# Patient Record
Sex: FEMALE | Race: WHITE
[De-identification: names, ages, dates, MRNs, and addresses within clinical notes are randomized per-mention and may not be internally consistent; named-entity substitution may affect disease eponyms.]

---

## 2017-01-07 ENCOUNTER — HOSPITAL ENCOUNTER (EMERGENCY)
Dept: HOSPITAL 17 - PHED | Age: 72
Discharge: HOME | End: 2017-01-07
Payer: MEDICARE

## 2017-01-07 VITALS
RESPIRATION RATE: 16 BRPM | HEART RATE: 82 BPM | DIASTOLIC BLOOD PRESSURE: 69 MMHG | OXYGEN SATURATION: 98 % | SYSTOLIC BLOOD PRESSURE: 124 MMHG

## 2017-01-07 VITALS — HEIGHT: 66 IN | WEIGHT: 169.76 LBS | BODY MASS INDEX: 27.28 KG/M2

## 2017-01-07 VITALS
DIASTOLIC BLOOD PRESSURE: 82 MMHG | HEART RATE: 106 BPM | SYSTOLIC BLOOD PRESSURE: 117 MMHG | OXYGEN SATURATION: 99 % | TEMPERATURE: 98.4 F | RESPIRATION RATE: 16 BRPM

## 2017-01-07 VITALS — OXYGEN SATURATION: 99 %

## 2017-01-07 DIAGNOSIS — K57.90: ICD-10-CM

## 2017-01-07 DIAGNOSIS — Z85.3: ICD-10-CM

## 2017-01-07 DIAGNOSIS — R53.83: ICD-10-CM

## 2017-01-07 DIAGNOSIS — E86.0: ICD-10-CM

## 2017-01-07 DIAGNOSIS — R31.29: ICD-10-CM

## 2017-01-07 DIAGNOSIS — R11.0: ICD-10-CM

## 2017-01-07 DIAGNOSIS — R10.9: Primary | ICD-10-CM

## 2017-01-07 LAB
ALP SERPL-CCNC: 54 U/L (ref 45–117)
ALT SERPL-CCNC: 36 U/L (ref 10–53)
ANION GAP SERPL CALC-SCNC: 9 MEQ/L (ref 5–15)
AST SERPL-CCNC: 16 U/L (ref 15–37)
BACTERIA #/AREA URNS HPF: (no result) /HPF
BASOPHILS # BLD AUTO: 0 TH/MM3 (ref 0–0.2)
BASOPHILS NFR BLD: 0.4 % (ref 0–2)
BILIRUB SERPL-MCNC: 0.3 MG/DL (ref 0.2–1)
BUN SERPL-MCNC: 16 MG/DL (ref 7–18)
CHLORIDE SERPL-SCNC: 103 MEQ/L (ref 98–107)
COLOR UR: YELLOW
COMMENT (UR): (no result)
CULTURE IF INDICATED: (no result)
EOSINOPHIL # BLD: 0.1 TH/MM3 (ref 0–0.4)
EOSINOPHIL NFR BLD: 1.6 % (ref 0–4)
ERYTHROCYTE [DISTWIDTH] IN BLOOD BY AUTOMATED COUNT: 12.5 % (ref 11.6–17.2)
GFR SERPLBLD BASED ON 1.73 SQ M-ARVRAT: 57 ML/MIN (ref 89–?)
GLUCOSE UR STRIP-MCNC: (no result) MG/DL
HCO3 BLD-SCNC: 29.8 MEQ/L (ref 21–32)
HCT VFR BLD CALC: 42 % (ref 35–46)
HEMO FLAGS: (no result)
HGB UR QL STRIP: (no result)
KETONES UR STRIP-MCNC: (no result) MG/DL
LEUKOCYTE ESTERASE UR QL STRIP: (no result) /HPF (ref 0–5)
LYMPHOCYTES # BLD AUTO: 3.1 TH/MM3 (ref 1–4.8)
LYMPHOCYTES NFR BLD AUTO: 41 % (ref 9–44)
MCH RBC QN AUTO: 30.7 PG (ref 27–34)
MCHC RBC AUTO-ENTMCNC: 33.4 % (ref 32–36)
MCV RBC AUTO: 91.9 FL (ref 80–100)
METHOD OF COLLECTION: (no result)
MONOCYTES NFR BLD: 8.7 % (ref 0–8)
NEUTROPHILS # BLD AUTO: 3.7 TH/MM3 (ref 1.8–7.7)
NEUTROPHILS NFR BLD AUTO: 48.3 % (ref 16–70)
NITRITE UR QL STRIP: (no result)
PLATELET # BLD: 344 TH/MM3 (ref 150–450)
POTASSIUM SERPL-SCNC: 4 MEQ/L (ref 3.5–5.1)
RBC # BLD AUTO: 4.57 MIL/MM3 (ref 4–5.3)
RBC #/AREA URNS HPF: (no result) /HPF (ref 0–3)
SODIUM SERPL-SCNC: 142 MEQ/L (ref 136–145)
SP GR UR STRIP: 1.02 (ref 1–1.03)
SQUAMOUS #/AREA URNS HPF: > 8 /HPF (ref 0–5)
WBC # BLD AUTO: 7.6 TH/MM3 (ref 4–11)

## 2017-01-07 PROCEDURE — 81001 URINALYSIS AUTO W/SCOPE: CPT

## 2017-01-07 PROCEDURE — 96360 HYDRATION IV INFUSION INIT: CPT

## 2017-01-07 PROCEDURE — 93005 ELECTROCARDIOGRAM TRACING: CPT

## 2017-01-07 PROCEDURE — 80053 COMPREHEN METABOLIC PANEL: CPT

## 2017-01-07 PROCEDURE — 74177 CT ABD & PELVIS W/CONTRAST: CPT

## 2017-01-07 PROCEDURE — 84484 ASSAY OF TROPONIN QUANT: CPT

## 2017-01-07 PROCEDURE — 85025 COMPLETE CBC W/AUTO DIFF WBC: CPT

## 2017-01-07 PROCEDURE — 99284 EMERGENCY DEPT VISIT MOD MDM: CPT

## 2017-01-07 PROCEDURE — 83690 ASSAY OF LIPASE: CPT

## 2017-01-07 NOTE — RADHPO
EXAM DATE/TIME:  2017 18:05 

 

[facility] COMPARISON:     

No previous studies available for comparison.

 

 

INDICATIONS :     

Left lower abdomen pain for two days.

                      

 

IV CONTRAST:     

85 cc Omnipaque 300 (iohexol) IV 

 

 

ORAL CONTRAST:      

No oral contrast ingested.

                      

 

RADIATION DOSE:     

11.18 CTDIvol (mGy) 

 

 

MEDICAL HISTORY :     

Carcinoma, breast.  

 

SURGICAL HISTORY :      

Mastectomy, right.  section.

 

ENCOUNTER:      

Initial

 

ACUITY:      

1 day

 

PAIN SCALE:      

0/10

 

LOCATION:       

Left lower quadrant 

 

TECHNIQUE:     

Volumetric scanning of the abdomen and pelvis was performed.  Using automated exposure control and ad
justment of the mA and/or kV according to patient size, radiation dose was kept as low as reasonably 
achievable to obtain optimal diagnostic quality images. 

 

FINDINGS:     

 

LOWER LUNGS:     

The visualized lower lungs are clear.

 

LIVER:     

Mild diffuse increase in hepatic density, likely steatosis with some focal sparing in the left lobe. 
No evidence of mass or biliary ductal dilatation. Tiny calcified gallstones.

 

SPLEEN:     

Normal size without lesion.

 

PANCREAS:     

Within normal limits.

 

KIDNEYS:     

Normal in size and shape.  There is no mass, stone or hydronephrosis.

 

ADRENAL GLANDS:     

Within normal limits.

 

VASCULAR:     

There is no aortic aneurysm.

 

BOWEL/MESENTERY:     

Prominent distal colonic diverticulosis. No evidence of abnormal wall thickening, dilatation or focal
 inflammatory change. No extraluminal gas or fluid.

 

ABDOMINAL WALL:     

Within normal limits.

 

RETROPERITONEUM:     

There is no lymphadenopathy. 

 

BLADDER:     

No wall thickening or mass. 

 

REPRODUCTIVE:     

Within normal limits.

 

INGUINAL:     

There is no lymphadenopathy or hernia. 

 

MUSCULOSKELETAL:     

Within normal limits for patient age. 

 

CONCLUSION:     

Hepatic steatosis.

Gallstones.

Colonic diverticulosis without definite CT findings of diverticulitis.

 

 

 

 Mika Patel MD on 2017 at 18:23           

Board Certified Radiologist.

 This report was verified electronically.

## 2017-01-07 NOTE — PD
HPI


Chief Complaint:  General Weakness


Time Seen by Provider:  16:26


Travel History


International Travel<30 days:  No


Contact w/Intl Traveler<30days:  No


Traveled to known affect area:  No





History of Present Illness


HPI


Patient is 71-year-old female presents today with fatigue for the past day.  

Patient states that 3 days ago she had abdominal cramping in the left lower 

quadrant thought she might be having a bout of diverticulitis.  She states her 

abdominal pain has been resolved by by the time she arrived in the emergency 

department.  She states she just feels rundown and fatigued and wanted to have 

her blood counts checked to make sure she wasn't anemic.  Patient states she's 

had some nausea without vomiting no diarrhea no constipation.  No fevers no 

blood in the stool.  No dysuria.





PFSH


Past Medical History


Cancer:  Yes (BREAST)


Diabetes:  No


Diminished Hearing:  No


Tetanus Vaccination:  > 5 Years


Influenza Vaccination:  Yes


Pregnant?:  Not Pregnant


Menopausal:  Yes





Past Surgical History


 Section:  Yes


Gynecologic Surgery:  Yes (RT BREAST MASTECTOMY)


Other Surgery:  Yes (MOD RIGHT MASTECTOMY. )





Social History


Alcohol Use:  No


Tobacco Use:  No


Substance Use:  No





Allergies-Medications


(Allergen,Severity, Reaction):  


Coded Allergies:  


     Avelox (Verified  Allergy, Severe, Anaphylaxis, 17)


     Levaquin (Verified  Allergy, Severe, Anaphylaxis, 17)


Reported Meds & Prescriptions





Reported Meds & Active Scripts


Active


Flagyl (Metronidazole) 500 Mg Tab 500 Mg PO BID 7 Days


Keflex (Cephalexin) 500 Mg Cap 500 Mg PO Q6H 7 Days


Zofran Odt (Ondansetron Odt) 4 Mg Tab 4 Mg SL Q6HR PRN








Review of Systems


Except as stated in HPI:  all other systems reviewed are Neg





Physical Exam


Narrative


GENERAL: [Well-developed well-nourished in no apparent distress


SKIN: Warm and dry.


HEAD: Atraumatic. Normocephalic. 


EYES: Pupils equal and round. No scleral icterus. No injection or drainage. 


ENT: No nasal bleeding or discharge.  Mucous membranes pink and moist.


NECK: Trachea midline. No JVD. 


CARDIOVASCULAR: Regular rate and rhythm.  No murmur appreciated.


RESPIRATORY: No accessory muscle use. Clear to auscultation. Breath sounds 

equal bilaterally. 


GASTROINTESTINAL: Abdomen soft, non-tender, nondistended. Hepatic and splenic 

margins not palpable. 


MUSCULOSKELETAL: No obvious deformities. No clubbing.  No cyanosis.  No edema. 


NEUROLOGICAL: Awake and alert. No obvious cranial nerve deficits.  Motor 

grossly within normal limits. Normal speech.


PSYCHIATRIC: Appropriate mood and affect; insight and judgment normal.





Data


Data


Last Documented VS





Vital Signs








  Date Time  Temp Pulse Resp B/P Pulse Ox O2 Delivery O2 Flow Rate FiO2


 


17 19:36     98   


 


17 19:05  82 16 124/69  Room Air  


 


17 16:00 98.4       








Orders





 Complete Blood Count With Diff (17 16:27)


Comprehensive Metabolic Panel (17 16:27)


Lipase (17 16:27)


Urinalysis - C+S If Indicated (17 16:27)


Iv Access Insert/Monitor (17 16:27)


Ecg Monitoring (17 16:27)


Oximetry (17 16:27)


Sodium Chloride 0.9% Flush (Ns Flush) (17 16:30)


Electrocardiogram (17 16:27)


Troponin I (17 16:27)


Sodium Chlor 0.9% 1000 Ml Inj (Ns 1000 M (17 16:45)


Ct Abd/Pel W Iv Contrast(Rout) (17 )


Iohexol 300 Inj (Omnipaque 300 Inj) (17 18:11)


Cephalexin (Keflex) (17 19:15)


Metronidazole (Flagyl) (17 19:15)





Labs





 Laboratory Tests








Test 17





 16:45 16:50


 


Urine Collection Type CLEAN CATCH  


 


Urine Color YELLOW  


 


Urine Turbidity CLEAR  


 


Urine pH 6.0  


 


Urine Specific Gravity 1.022  


 


Urine Protein NEG mg/dL 


 


Urine Glucose (UA) NEG mg/dL 


 


Urine Ketones NEG mg/dL 


 


Urine Occult Blood TRACE  


 


Urine Nitrite NEG  


 


Urine Bilirubin NEG  


 


Urine Leukocyte Esterase TRACE  


 


Urine RBC 4-9 /hpf 


 


Urine WBC 6-8 /hpf 


 


Urine Squamous Epithelial > 8 /hpf 





Cells  


 


Urine Bacteria RARE /hpf 


 


Microscopic Urinalysis Comment CULT NOT 





 INDICATED 


 


Urine Collection Time 16:45  


 


White Blood Count  7.6 TH/MM3


 


Red Blood Count  4.57 MIL/MM3


 


Hemoglobin  14.1 GM/DL


 


Hematocrit  42.0 %


 


Mean Corpuscular Volume  91.9 FL


 


Mean Corpuscular Hemoglobin  30.7 PG


 


Mean Corpuscular Hemoglobin  33.4 %





Concent  


 


Red Cell Distribution Width  12.5 %


 


Platelet Count  344 TH/MM3


 


Mean Platelet Volume  7.1 FL


 


Neutrophils (%) (Auto)  48.3 %


 


Lymphocytes (%) (Auto)  41.0 %


 


Monocytes (%) (Auto)  8.7 %


 


Eosinophils (%) (Auto)  1.6 %


 


Basophils (%) (Auto)  0.4 %


 


Neutrophils # (Auto)  3.7 TH/MM3


 


Lymphocytes # (Auto)  3.1 TH/MM3


 


Monocytes # (Auto)  0.7 TH/MM3


 


Eosinophils # (Auto)  0.1 TH/MM3


 


Basophils # (Auto)  0.0 TH/MM3


 


CBC Comment  DIFF FINAL 


 


Differential Comment   


 


Sodium Level  142 MEQ/L


 


Potassium Level  4.0 MEQ/L


 


Chloride Level  103 MEQ/L


 


Carbon Dioxide Level  29.8 MEQ/L


 


Anion Gap  9 MEQ/L


 


Blood Urea Nitrogen  16 MG/DL


 


Creatinine  0.96 MG/DL


 


Estimat Glomerular Filtration  57 ML/MIN





Rate  


 


Random Glucose  106 MG/DL


 


Calcium Level  9.4 MG/DL


 


Total Bilirubin  0.3 MG/DL


 


Aspartate Amino Transf  16 U/L





(AST/SGOT)  


 


Alanine Aminotransferase  36 U/L





(ALT/SGPT)  


 


Alkaline Phosphatase  54 U/L


 


Troponin I  LESS THAN 0.02





  NG/ML


 


Total Protein  8.1 GM/DL


 


Albumin  3.8 GM/DL


 


Lipase  169 U/L











MDM


Medical Decision Making


Medical Screen Exam Complete:  Yes


Emergency Medical Condition:  Yes


Interpretation(s)


EKG shows normal sinus rhythm with normal axis normal R-wave progression.  No 

concerning ST T changes intervals within normal limits.  This normal EKG.


Differential Diagnosis


Diverticulitis, dehydration, acute kidney injury, electrolyte abnormality, 

cardiac etiology unlikely, fatigue, gastritis, gastritis, colitis.


Narrative Course


Patient was roomed in the emergency department, pain-free on arrival.  Patient 

was given 1 L normal saline.  Labs are reassuring.  However patient does have 

microscopic hematuria.  Nitrate and leukocyte Estrace free.  Discussed with the 

patient need to consider CAT scan as part of his workup for hematuria and she 

is agreeable.  CAT scan shows diverticulosis without diverticulitis.  No masses 

observed.  Will treat with Keflex that she has a Levaquin allergy, as well as 

Flagyl for probable diverticulitis flare now resolved.  Discussed need follow-

up with primary care physician and return to ED criteria.  She isn't amenable 

and feels well on discharge.  Stable for discharge.





Diagnosis





 Primary Impression:  


 Abdominal cramping


 Additional Impressions:  


 Dehydration


 Fatigue


***Med/Other Pt SpecificInfo:  Prescription(s) given


Scripts


Metronidazole (Flagyl)500 Mg [id number] Mg PO BID  7 Days  Ref 0


   Prov:Baldemar Colon MD         17 


Cephalexin (Keflex)500 Mg [id number] Mg PO Q6H  7 Days  Ref 0


   Prov:Baldemar Colon MD         17 


Ondansetron Odt (Zofran Odt)4 Mg Tab4 Mg SL Q6HR PRN (Nausea/Vomiting) #30 TAB  

Ref 0


   Prov:Baldemar Colon MD         17


Disposition:  01 DISCHARGE HOME


Condition:  Stable








Baldemar Colon MD 2017 18:57

## 2017-01-08 NOTE — EKG
Date Performed: 01/07/2017       Time Performed: 16:32:36

 

PTAGE:      71 years

 

EKG:      Sinus rhythm 

 

 Undulating baseline may affect reading 

 

NO PREVIOUS TRACING            

 

DOCTOR:   Chacho Kapadia  Interpretating Date/Time  01/08/2017 17:11:09

## 2017-06-29 ENCOUNTER — HOSPITAL ENCOUNTER (EMERGENCY)
Dept: HOSPITAL 17 - PHED | Age: 72
Discharge: HOME | End: 2017-06-29
Payer: MEDICARE

## 2017-06-29 VITALS
HEART RATE: 84 BPM | OXYGEN SATURATION: 97 % | TEMPERATURE: 98 F | SYSTOLIC BLOOD PRESSURE: 122 MMHG | DIASTOLIC BLOOD PRESSURE: 81 MMHG | RESPIRATION RATE: 16 BRPM

## 2017-06-29 VITALS — WEIGHT: 170.42 LBS | HEIGHT: 66 IN | BODY MASS INDEX: 27.39 KG/M2

## 2017-06-29 VITALS
RESPIRATION RATE: 16 BRPM | HEART RATE: 78 BPM | DIASTOLIC BLOOD PRESSURE: 68 MMHG | OXYGEN SATURATION: 98 % | SYSTOLIC BLOOD PRESSURE: 128 MMHG

## 2017-06-29 VITALS — OXYGEN SATURATION: 98 %

## 2017-06-29 VITALS — SYSTOLIC BLOOD PRESSURE: 156 MMHG | DIASTOLIC BLOOD PRESSURE: 79 MMHG | RESPIRATION RATE: 16 BRPM

## 2017-06-29 DIAGNOSIS — R42: Primary | ICD-10-CM

## 2017-06-29 DIAGNOSIS — Z90.11: ICD-10-CM

## 2017-06-29 DIAGNOSIS — Z85.3: ICD-10-CM

## 2017-06-29 LAB
ALP SERPL-CCNC: 53 U/L (ref 45–117)
ALT SERPL-CCNC: 33 U/L (ref 10–53)
ANION GAP SERPL CALC-SCNC: 10 MEQ/L (ref 5–15)
AST SERPL-CCNC: 15 U/L (ref 15–37)
BACTERIA #/AREA URNS HPF: (no result) /HPF
BASOPHILS # BLD AUTO: 0 TH/MM3 (ref 0–0.2)
BASOPHILS NFR BLD: 0.4 % (ref 0–2)
BILIRUB SERPL-MCNC: 0.4 MG/DL (ref 0.2–1)
BUN SERPL-MCNC: 11 MG/DL (ref 7–18)
CHLORIDE SERPL-SCNC: 105 MEQ/L (ref 98–107)
COLOR UR: YELLOW
COMMENT (UR): (no result)
CULTURE IF INDICATED: (no result)
EOSINOPHIL # BLD: 0.1 TH/MM3 (ref 0–0.4)
EOSINOPHIL NFR BLD: 0.8 % (ref 0–4)
ERYTHROCYTE [DISTWIDTH] IN BLOOD BY AUTOMATED COUNT: 13.9 % (ref 11.6–17.2)
GFR SERPLBLD BASED ON 1.73 SQ M-ARVRAT: 60 ML/MIN (ref 89–?)
GLUCOSE UR STRIP-MCNC: (no result) MG/DL
HCO3 BLD-SCNC: 26.1 MEQ/L (ref 21–32)
HCT VFR BLD CALC: 41.2 % (ref 35–46)
HEMO FLAGS: (no result)
HGB UR QL STRIP: (no result)
KETONES UR STRIP-MCNC: (no result) MG/DL
LEUKOCYTE ESTERASE UR QL STRIP: (no result) /HPF (ref 0–5)
LYMPHOCYTES # BLD AUTO: 1.7 TH/MM3 (ref 1–4.8)
LYMPHOCYTES NFR BLD AUTO: 22.7 % (ref 9–44)
MCH RBC QN AUTO: 30.5 PG (ref 27–34)
MCHC RBC AUTO-ENTMCNC: 33.2 % (ref 32–36)
MCV RBC AUTO: 91.9 FL (ref 80–100)
METHOD OF COLLECTION: (no result)
MONOCYTES NFR BLD: 6.5 % (ref 0–8)
NEUTROPHILS # BLD AUTO: 5 TH/MM3 (ref 1.8–7.7)
NEUTROPHILS NFR BLD AUTO: 69.6 % (ref 16–70)
NITRITE UR QL STRIP: (no result)
PLATELET # BLD: 301 TH/MM3 (ref 150–450)
POTASSIUM SERPL-SCNC: 3.7 MEQ/L (ref 3.5–5.1)
RBC # BLD AUTO: 4.48 MIL/MM3 (ref 4–5.3)
RBC #/AREA URNS HPF: (no result) /HPF (ref 0–3)
SODIUM SERPL-SCNC: 141 MEQ/L (ref 136–145)
SP GR UR STRIP: 1.01 (ref 1–1.03)
SQUAMOUS #/AREA URNS HPF: (no result) /HPF (ref 0–5)
WBC # BLD AUTO: 7.3 TH/MM3 (ref 4–11)

## 2017-06-29 PROCEDURE — 83690 ASSAY OF LIPASE: CPT

## 2017-06-29 PROCEDURE — 96360 HYDRATION IV INFUSION INIT: CPT

## 2017-06-29 PROCEDURE — 80053 COMPREHEN METABOLIC PANEL: CPT

## 2017-06-29 PROCEDURE — 84484 ASSAY OF TROPONIN QUANT: CPT

## 2017-06-29 PROCEDURE — 81001 URINALYSIS AUTO W/SCOPE: CPT

## 2017-06-29 PROCEDURE — 99285 EMERGENCY DEPT VISIT HI MDM: CPT

## 2017-06-29 PROCEDURE — 93005 ELECTROCARDIOGRAM TRACING: CPT

## 2017-06-29 PROCEDURE — 70450 CT HEAD/BRAIN W/O DYE: CPT

## 2017-06-29 PROCEDURE — 85025 COMPLETE CBC W/AUTO DIFF WBC: CPT

## 2017-06-29 NOTE — PD
HPI


Chief Complaint:  General Weakness


Time Seen by Provider:  07:15


Travel History


International Travel<30 days:  No


Contact w/Intl Traveler<30days:  No


Traveled to known affect area:  No





History of Present Illness


HPI


71-year-old female came to the emergency room with history of feeling 

lightheaded since yesterday.  Patient went to see her primary care where her UA 

was checked and she was diagnosed with UTI.  She was discharged home on 

Bactrim.  Patient says she has taken 2 pills so far and has been drinking 

fluid.  She feels a little bit better but this morning when she woke up she was 

still was little lightheaded.  No history of vomiting or diarrhea.  She feels a 

little nauseous.  Feels like she is little disbalance.  Vital signs were 

otherwise stable.  Patient had history of breast cancer.  Ears ago and is in 

total remission now as per her.  She is not on any daily medications she said.  

Otherwise is healthy.  She told me she usually does not drink a lot of fluid 

and thinks she could be dehydrated.





[facility]


Past Medical History


*** Narrative Medical


List of her past medical, surgical, social and family history was reviewed from 

the nursing note.


Cancer:  Yes (BREAST)


Diabetes:  No


Diminished Hearing:  No


Menopausal:  Yes





Past Surgical History


 Section:  Yes


Gynecologic Surgery:  Yes (RT BREAST MASTECTOMY)


Other Surgery:  Yes (MOD RIGHT MASTECTOMY. )





Social History


Alcohol Use:  No


Tobacco Use:  No


Substance Use:  No





Allergies-Medications


(Allergen,Severity, Reaction):  


Coded Allergies:  


     Avelox (Verified  Allergy, Severe, Anaphylaxis, 17)


     Levaquin (Verified  Allergy, Severe, Anaphylaxis, 17)


Comments


List of her allergies reviewed from the nursing note.


Reported Meds & Prescriptions





Reported Meds & Active Scripts


Active


Reported


Bactrim DS (Sulfamethoxazole-Trimethoprim) 800-160 Mg Tab 1 Tab PO BID





Narrative Medication


List of her home medications reviewed from the nursing note.





Review of Systems


Except as stated in HPI:  all other systems reviewed are Neg





Physical Exam


Narrative


GENERAL: Awake, alert, moderate distress


SKIN: Focused skin assessment warm/dry.


HEAD: Atraumatic. Normocephalic. 


EYES: Pupils equal and round. No scleral icterus. No injection or drainage. 


ENT: No nasal bleeding or discharge.  Dry mucous membrane


NECK: Trachea midline. No JVD. 


CARDIOVASCULAR: Regular rate and rhythm.  No murmur appreciated.


RESPIRATORY: No accessory muscle use. Clear to auscultation. Breath sounds 

equal bilaterally. 


GASTROINTESTINAL: Abdomen soft, non-tender, nondistended. Hepatic and splenic 

margins not palpable. 


MUSCULOSKELETAL: No obvious deformities. No clubbing.  No cyanosis.  No edema. 


NEUROLOGICAL: Awake and alert. No obvious cranial nerve deficits.  Motor 

grossly within normal limits. Normal speech.


PSYCHIATRIC: Appropriate mood and affect; insight and judgment normal.





Data


Data


Last Documented VS





Vital Signs








  Date Time  Temp Pulse Resp B/P Pulse Ox O2 Delivery O2 Flow Rate FiO2


 


17 08:43  84 16 149/79    





  82 16 156/82    





  88 16 139/73    


 


17 07:52     98 Room Air  


 


17 06:36 98.0       








Orders





 Urinalysis - C+S If Indicated (17 07:05)


Complete Blood Count With Diff (17 07:28)


Comprehensive Metabolic Panel (17 07:28)


Lipase (17 07:28)


Iv Access Insert/Monitor (17 07:28)


Ecg Monitoring (17 07:28)


Oximetry (17 07:28)


Sodium Chloride 0.9% Flush (Ns Flush) (17 07:30)


Electrocardiogram (17 07:28)


Troponin I (17 07:28)


Sodium Chlorid 0.9% 500 Ml Inj (Ns 500 M (17 07:45)


Ct Brain W/O Iv Contrast(Rout) (17 )





Labs





 Laboratory Tests








Test 17





 07:09 07:49


 


Urine Collection Type CLEAN CATCH  


 


Urine Color YELLOW  


 


Urine Turbidity CLEAR  


 


Urine pH 6.0  


 


Urine Specific Gravity 1.015  


 


Urine Protein NEG mg/dL 


 


Urine Glucose (UA) NEG mg/dL 


 


Urine Ketones NEG mg/dL 


 


Urine Occult Blood TRACE  


 


Urine Nitrite NEG  


 


Urine Bilirubin NEG  


 


Urine Leukocyte Esterase SMALL  


 


Urine RBC 0-3 /hpf 


 


Urine WBC 3-5 /hpf 


 


Urine Squamous Epithelial 6-8 /hpf 





Cells  


 


Urine Bacteria OCC /hpf 


 


Microscopic Urinalysis Comment CULT NOT 





 INDICATED 


 


Urine Collection Time 07:09  


 


White Blood Count  7.3 TH/MM3


 


Red Blood Count  4.48 MIL/MM3


 


Hemoglobin  13.7 GM/DL


 


Hematocrit  41.2 %


 


Mean Corpuscular Volume  91.9 FL


 


Mean Corpuscular Hemoglobin  30.5 PG


 


Mean Corpuscular Hemoglobin  33.2 %





Concent  


 


Red Cell Distribution Width  13.9 %


 


Platelet Count  301 TH/MM3


 


Mean Platelet Volume  7.0 FL


 


Neutrophils (%) (Auto)  69.6 %


 


Lymphocytes (%) (Auto)  22.7 %


 


Monocytes (%) (Auto)  6.5 %


 


Eosinophils (%) (Auto)  0.8 %


 


Basophils (%) (Auto)  0.4 %


 


Neutrophils # (Auto)  5.0 TH/MM3


 


Lymphocytes # (Auto)  1.7 TH/MM3


 


Monocytes # (Auto)  0.5 TH/MM3


 


Eosinophils # (Auto)  0.1 TH/MM3


 


Basophils # (Auto)  0.0 TH/MM3


 


CBC Comment  DIFF FINAL 


 


Differential Comment   


 


Sodium Level  141 MEQ/L


 


Potassium Level  3.7 MEQ/L


 


Chloride Level  105 MEQ/L


 


Carbon Dioxide Level  26.1 MEQ/L


 


Anion Gap  10 MEQ/L


 


Blood Urea Nitrogen  11 MG/DL


 


Creatinine  0.93 MG/DL


 


Estimat Glomerular Filtration  60 ML/MIN





Rate  


 


Random Glucose  113 MG/DL


 


Calcium Level  9.4 MG/DL


 


Total Bilirubin  0.4 MG/DL


 


Aspartate Amino Transf  15 U/L





(AST/SGOT)  


 


Alanine Aminotransferase  33 U/L





(ALT/SGPT)  


 


Alkaline Phosphatase  53 U/L


 


Troponin I  LESS THAN 0.02





  NG/ML


 


Total Protein  7.6 GM/DL


 


Albumin  3.6 GM/DL


 


Lipase  138 U/L











MDM


Medical Decision Making


Medical Screen Exam Complete:  Yes


Emergency Medical Condition:  Yes


Medical Record Reviewed:  Yes


Interpretation(s)


Twelve-lead EKG was reviewed by me.  Normal sinus rhythm, left axis deviation, 

nonspecific ST-T wave changes.  Heart rate of 79 bpm.


Differential Diagnosis


Dehydration, ACS, anxiety


Narrative Course


8:34 AM had ordered some basic blood test including troponin.  They're all 

within normal limits.  Patient requested fluid bolus and I ordered 500 cc of 

fluid bolus in spite of the fact that lab work does not point to any 

dehydration.  Patient currently says she feels better after the fluid.  I am 

still not sure of the main concern of this patient.  She did go to see her 

primary care as she was diagnosed with UTI but was told everything else was 

okay.  She certainly was not convinced and hence came to this emergency room.  

She insisted that this is from dehydration which could well be the case 

although the blood test seems to be within normal limits.  I have asked the 

nurse to ambulate her.  If she ambulates well and does not feel dizzy I'll 

discharge her.





8:40 AM patient was ambulated and she said she still felt a little wobbly.  

Based on this I have ordered a CT scan of her head.  She asked me if she would 

need more fluid to which I responded not have asked the nurse to check her 

orthostatic vital signs as well.





10:05 AM CT scan is within normal limits.  Patient will be discharged home.





Procedures


EKG Prior to Arrival:  No





Diagnosis





 Primary Impression:  


 Dizziness


Referrals:  


Primary Care Physician


1 day


Patient Instructions:  Moderate Sedation in Children (ED)





***Additional Instructions:


Please return to the ER if the condition worsens or any other new concerns.  

Otherwise follow-up with your primary care in 1-2 days.  You should not be 

driving until his symptoms have completely resolved.


***Med/Other Pt SpecificInfo:  No Change to Meds


Disposition:  01 DISCHARGE HOME


Condition:  Stable








Thelma Shine MD 2017 07:16

## 2017-06-29 NOTE — RADRPT
EXAM DATE/TIME:  06/29/2017 09:27 

 

[facility] COMPARISON:     

CT BRAIN W/O CONTRAST, July 17, 2013, 13:44.

 

 

INDICATIONS :     

Dizziness.

                      

 

RADIATION DOSE:     

59.60 CTDIvol (mGy) 

 

 

 

MEDICAL HISTORY :     

Carcinoma, breast.  

 

SURGICAL HISTORY :      

Mastectomy, right. 

 

ENCOUNTER:      

Initial

 

ACUITY:      

2 days

 

PAIN SCALE:      

0/10

 

LOCATION:       

Bilateral cranial 

 

TECHNIQUE:     

Multiple contiguous axial images were obtained of the head.  Using automated exposure control and adj
ustment of the mA and/or kV according to patient size, radiation dose was kept as low as reasonably a
chievable to obtain optimal diagnostic quality images.   DICOM format image data is available electro
nically for review and comparison.  

 

FINDINGS:     

 

CEREBRUM:     

The ventricles are normal for age.  No evidence of midline shift, mass lesion, hemorrhage or acute in
farction.  No extra-axial fluid collections are seen.

 

POSTERIOR FOSSA:     

The cerebellum and brainstem are intact.  The 4th ventricle is midline.  The cerebellopontine angle i
s unremarkable.

 

EXTRACRANIAL:     

The visualized portion of the orbits is intact.

 

SKULL:     

The calvaria is intact.  No evidence of skull fracture.

 

CONCLUSION:     

1. No acute intracranial abnormality.

 

 

 

 Jorge Alberto Reyes MD on June 29, 2017 at 9:45           

Board Certified Radiologist.

 This report was verified electronically.

## 2017-06-29 NOTE — EKG
Date Performed: 06/29/2017       Time Performed: 07:39:12

 

PTAGE:      71 years

 

EKG:      Sinus rhythm 

 

 NORMAL ECG

 

PREVIOUS TRACING       : 01/07/2017 16.32

 

DOCTOR:   Selwyn Hutchins  Interpretating Date/Time  06/29/2017 15:35:39

## 2018-03-21 ENCOUNTER — HOSPITAL ENCOUNTER (EMERGENCY)
Dept: HOSPITAL 17 - PHED | Age: 73
Discharge: HOME | End: 2018-03-21
Payer: MEDICARE

## 2018-03-21 VITALS
DIASTOLIC BLOOD PRESSURE: 75 MMHG | SYSTOLIC BLOOD PRESSURE: 142 MMHG | TEMPERATURE: 97.6 F | RESPIRATION RATE: 18 BRPM | HEART RATE: 117 BPM | OXYGEN SATURATION: 97 %

## 2018-03-21 VITALS — WEIGHT: 167.55 LBS | BODY MASS INDEX: 26.93 KG/M2 | HEIGHT: 66 IN

## 2018-03-21 VITALS
RESPIRATION RATE: 16 BRPM | SYSTOLIC BLOOD PRESSURE: 121 MMHG | HEART RATE: 77 BPM | OXYGEN SATURATION: 96 % | DIASTOLIC BLOOD PRESSURE: 63 MMHG

## 2018-03-21 VITALS — OXYGEN SATURATION: 96 % | RESPIRATION RATE: 16 BRPM

## 2018-03-21 DIAGNOSIS — E86.0: Primary | ICD-10-CM

## 2018-03-21 DIAGNOSIS — Z90.11: ICD-10-CM

## 2018-03-21 DIAGNOSIS — K57.92: ICD-10-CM

## 2018-03-21 DIAGNOSIS — Z85.3: ICD-10-CM

## 2018-03-21 DIAGNOSIS — Z88.8: ICD-10-CM

## 2018-03-21 DIAGNOSIS — Z79.899: ICD-10-CM

## 2018-03-21 LAB
BASOPHILS # BLD AUTO: 0 TH/MM3 (ref 0–0.2)
BASOPHILS NFR BLD: 0.5 % (ref 0–2)
BUN SERPL-MCNC: 11 MG/DL (ref 7–18)
CALCIUM SERPL-MCNC: 9.7 MG/DL (ref 8.5–10.1)
CHLORIDE SERPL-SCNC: 101 MEQ/L (ref 98–107)
COLOR UR: YELLOW
CREAT SERPL-MCNC: 1 MG/DL (ref 0.5–1)
EOSINOPHIL # BLD: 0.1 TH/MM3 (ref 0–0.4)
EOSINOPHIL NFR BLD: 1.2 % (ref 0–4)
ERYTHROCYTE [DISTWIDTH] IN BLOOD BY AUTOMATED COUNT: 12.5 % (ref 11.6–17.2)
GFR SERPLBLD BASED ON 1.73 SQ M-ARVRAT: 55 ML/MIN (ref 89–?)
GLUCOSE SERPL-MCNC: 121 MG/DL (ref 74–106)
GLUCOSE UR STRIP-MCNC: (no result) MG/DL
HCO3 BLD-SCNC: 26.5 MEQ/L (ref 21–32)
HCT VFR BLD CALC: 41.3 % (ref 35–46)
HGB BLD-MCNC: 13.6 GM/DL (ref 11.6–15.3)
HGB UR QL STRIP: (no result)
KETONES UR STRIP-MCNC: (no result) MG/DL
LEUKOCYTE ESTERASE UR QL STRIP: (no result) /HPF (ref 0–5)
LYMPHOCYTES # BLD AUTO: 2.3 TH/MM3 (ref 1–4.8)
LYMPHOCYTES NFR BLD AUTO: 32 % (ref 9–44)
MCH RBC QN AUTO: 30.3 PG (ref 27–34)
MCHC RBC AUTO-ENTMCNC: 33 % (ref 32–36)
MCV RBC AUTO: 92 FL (ref 80–100)
MONOCYTE #: 0.6 TH/MM3 (ref 0–0.9)
MONOCYTES NFR BLD: 8.4 % (ref 0–8)
NEUTROPHILS # BLD AUTO: 4.1 TH/MM3 (ref 1.8–7.7)
NEUTROPHILS NFR BLD AUTO: 57.9 % (ref 16–70)
NITRITE UR QL STRIP: (no result)
PLATELET # BLD: 366 TH/MM3 (ref 150–450)
PMV BLD AUTO: 7.3 FL (ref 7–11)
RBC # BLD AUTO: 4.49 MIL/MM3 (ref 4–5.3)
SODIUM SERPL-SCNC: 136 MEQ/L (ref 136–145)
SP GR UR STRIP: 1.01 (ref 1–1.03)
SQUAMOUS #/AREA URNS HPF: (no result) /HPF (ref 0–5)
URINE LEUKOCYTE ESTERASE: (no result)
WBC # BLD AUTO: 7.1 TH/MM3 (ref 4–11)

## 2018-03-21 PROCEDURE — 96374 THER/PROPH/DIAG INJ IV PUSH: CPT

## 2018-03-21 PROCEDURE — 99284 EMERGENCY DEPT VISIT MOD MDM: CPT

## 2018-03-21 PROCEDURE — 81001 URINALYSIS AUTO W/SCOPE: CPT

## 2018-03-21 PROCEDURE — 96361 HYDRATE IV INFUSION ADD-ON: CPT

## 2018-03-21 PROCEDURE — 85025 COMPLETE CBC W/AUTO DIFF WBC: CPT

## 2018-03-21 PROCEDURE — 80048 BASIC METABOLIC PNL TOTAL CA: CPT

## 2018-03-21 NOTE — PD
HPI


Chief Complaint:  Abdominal Pain


Time Seen by Provider:  11:03


Travel History


International Travel<30 days:  No


Contact w/Intl Traveler<30days:  No


Traveled to known affect area:  No





History of Present Illness


HPI


72-year-old female reports a vague sense of dizziness and the belief that she 

is dehydrated.  The sensation of cold sweats is also reported.  The patient 

reports a history of diverticulitis and underwent CT scanning 2 days prior and 

was subsequently placed on Flagyl and Bactrim.  She reports resolution of left 

lower quadrant pain which had led to the CT evaluation however the symptoms as 

described began yesterday.  The patient administered Bactrim and Flagyl dose 

this morning thinking that her symptoms may have been from the antibiotics.  No 

urinary complaint.  No vaginal bleeding or discharge.





PFSH


Past Medical History


Cancer:  Yes (BREAST)


Diabetes:  No


Diminished Hearing:  No


Pregnant?:  Not Pregnant


Menopausal:  Yes





Past Surgical History


 Section:  Yes


Gynecologic Surgery:  Yes (RT BREAST MASTECTOMY)


Other Surgery:  Yes (MOD RIGHT MASTECTOMY. )





Social History


Alcohol Use:  No


Tobacco Use:  No


Substance Use:  No





Allergies-Medications


(Allergen,Severity, Reaction):  


Coded Allergies:  


     Quinolones (Verified  Allergy, Severe, 3/21/18)


     levofloxacin (Unverified  Allergy, Severe, Anaphylaxis, 3/21/18)


     moxifloxacin (Unverified  Allergy, Severe, Anaphylaxis, 3/21/18)


Reported Meds & Prescriptions





Reported Meds & Active Scripts


Active


Reported


Metronidazole 500 Mg Tab 500 Mg PO QID


Bactrim DS (Sulfamethoxazole-Trimethoprim) 800-160 Mg Tab 1 Tab PO BID








Review of Systems


Except as stated in HPI:  all other systems reviewed are Neg


General / Constitutional:  No: Fever





Physical Exam


Narrative


GENERAL: 72-year-old female pleasant well-nourished well-developed





Vital Signs








  Date Time  Temp Pulse Resp B/P (MAP) Pulse Ox O2 Delivery O2 Flow Rate FiO2


 


3/21/18 10:54 97.6 117 18 142/75 (97) 97   








SKIN: Warm and dry.


HEAD: Atraumatic. Normocephalic. 


EYES: Pupils equal and round. No scleral icterus. No injection or drainage. 


ENT: No nasal bleeding or discharge.  Mucous membranes pink and moist.


NECK: Trachea midline. No JVD. 


CARDIOVASCULAR: Tachycardia.  Regular rhythm.


RESPIRATORY: No accessory muscle use. Clear to auscultation. Breath sounds 

equal bilaterally. 


GASTROINTESTINAL: Soft.  No focus of tenderness.


MUSCULOSKELETAL: Extremities without clubbing, cyanosis, or edema. No obvious 

deformities. 


NEUROLOGICAL: Awake and alert. No obvious cranial nerve deficits.  Motor 

grossly within normal limits. Five out of 5 muscle strength in the arms and 

legs.  Normal speech.


PSYCHIATRIC: Appropriate mood and affect; insight and judgment normal.





Data


Data


Last Documented VS





Vital Signs








  Date Time  Temp Pulse Resp B/P (MAP) Pulse Ox O2 Delivery O2 Flow Rate FiO2


 


3/21/18 12:53        


 


3/21/18 12:19  77 16  96 Room Air  


 


3/21/18 10:54 97.6       








Orders





 Orders


Urinalysis - C+S If Indicated (3/21/18 10:55)


Basic Metabolic Panel (Bmp) (3/21/18 11:11)


Complete Blood Count With Diff (3/21/18 11:11)


Iv Access Insert/Monitor (3/21/18 11:11)


Ecg Monitoring (3/21/18 11:11)


Oximetry (3/21/18 11:11)


Ondansetron Inj (Zofran Inj) (3/21/18 11:15)


Sodium Chlor 0.9% 1000 Ml Inj (Ns 1000 M (3/21/18 11:11)


Sodium Chloride 0.9% Flush (Ns Flush) (3/21/18 11:15)


Sodium Chlor 0.9% 1000 Ml Inj (Ns 1000 M (3/21/18 12:15)


Ed Discharge Order (3/21/18 12:53)





Labs





Laboratory Tests








Test


  3/21/18


11:00 3/21/18


11:15


 


Urine Color YELLOW  


 


Urine Turbidity CLEAR  


 


Urine pH 6.5  


 


Urine Specific Gravity 1.015  


 


Urine Protein NEG mg/dL  


 


Urine Glucose (UA) NEG mg/dL  


 


Urine Ketones NEG mg/dL  


 


Urine Occult Blood NEG  


 


Urine Nitrite NEG  


 


Urine Bilirubin NEG  


 


Urine Urobilinogen 0.2 MG/DL  


 


Urine Leukocyte Esterase TRACE  


 


Urine WBC 0-2 /hpf  


 


Urine Squamous Epithelial


Cells 0-5 /hpf 


  


 


 


Microscopic Urinalysis Comment


  CULT NOT


INDICATED 


 


 


White Blood Count  7.1 TH/MM3 


 


Red Blood Count  4.49 MIL/MM3 


 


Hemoglobin  13.6 GM/DL 


 


Hematocrit  41.3 % 


 


Mean Corpuscular Volume  92.0 FL 


 


Mean Corpuscular Hemoglobin  30.3 PG 


 


Mean Corpuscular Hemoglobin


Concent 


  33.0 % 


 


 


Red Cell Distribution Width  12.5 % 


 


Platelet Count  366 TH/MM3 


 


Mean Platelet Volume  7.3 FL 


 


Neutrophils (%) (Auto)  57.9 % 


 


Lymphocytes (%) (Auto)  32.0 % 


 


Monocytes (%) (Auto)  8.4 % 


 


Eosinophils (%) (Auto)  1.2 % 


 


Basophils (%) (Auto)  0.5 % 


 


Neutrophils # (Auto)  4.1 TH/MM3 


 


Lymphocytes # (Auto)  2.3 TH/MM3 


 


Monocytes # (Auto)  0.6 TH/MM3 


 


Eosinophils # (Auto)  0.1 TH/MM3 


 


Basophils # (Auto)  0.0 TH/MM3 


 


CBC Comment  DIFF FINAL 


 


Differential Comment   


 


Blood Urea Nitrogen  11 MG/DL 


 


Creatinine  1.00 MG/DL 


 


Random Glucose  121 MG/DL 


 


Calcium Level  9.7 MG/DL 


 


Sodium Level  136 MEQ/L 


 


Potassium Level  4.1 MEQ/L 


 


Chloride Level  101 MEQ/L 


 


Carbon Dioxide Level  26.5 MEQ/L 


 


Anion Gap  9 MEQ/L 


 


Estimat Glomerular Filtration


Rate 


  55 ML/MIN 


 











MDM


Medical Decision Making


Medical Screen Exam Complete:  Yes


Emergency Medical Condition:  Yes


Medical Record Reviewed:  Yes


Differential Diagnosis


Constipation, Gastritis, Acute Cholecystitis, Biliary Colic, Pancreatitis, LOPEZ

, Hepatitis, Bowel Obstruction, Cystitis, Mesenteric Ischemia, AAA, Appendicitis

, Renal Stone/Hydronephrosis, GERD, perforated viscous


Narrative Course


CBC & BMP Diagram


3/21/18 11:15








Calcium Level 9.7





UA no UTI





IVF and zofran given


pt to continue Bactrim and Flagyl


pt ok for discharge home


pt to follow up with primary





Diagnosis





 Primary Impression:  


 Dizziness


 Additional Impressions:  


 Dehydration


 Diverticulitis


Referrals:  


Murphy Wright MD


call for appointment


***Med/Other Pt SpecificInfo:  No Change to Meds


Disposition:  01 DISCHARGE HOME


Condition:  Stable











Yrn Kapadia MD Mar 21, 2018 11:23